# Patient Record
Sex: MALE | Race: WHITE | NOT HISPANIC OR LATINO | Employment: UNEMPLOYED | URBAN - METROPOLITAN AREA
[De-identification: names, ages, dates, MRNs, and addresses within clinical notes are randomized per-mention and may not be internally consistent; named-entity substitution may affect disease eponyms.]

---

## 2021-05-30 ENCOUNTER — RECORDS - HEALTHEAST (OUTPATIENT)
Dept: ADMINISTRATIVE | Facility: CLINIC | Age: 21
End: 2021-05-30

## 2024-03-19 ENCOUNTER — HOSPITAL ENCOUNTER (EMERGENCY)
Facility: CLINIC | Age: 24
Discharge: HOME | End: 2024-03-19
Attending: EMERGENCY MEDICINE | Admitting: EMERGENCY MEDICINE
Payer: MEDICARE

## 2024-03-19 VITALS
BODY MASS INDEX: 21.09 KG/M2 | WEIGHT: 147 LBS | TEMPERATURE: 97.9 F | OXYGEN SATURATION: 97 % | HEART RATE: 56 BPM | RESPIRATION RATE: 16 BRPM | DIASTOLIC BLOOD PRESSURE: 57 MMHG | SYSTOLIC BLOOD PRESSURE: 109 MMHG

## 2024-03-19 DIAGNOSIS — G43.809 OTHER MIGRAINE WITHOUT STATUS MIGRAINOSUS, NOT INTRACTABLE: ICD-10-CM

## 2024-03-19 PROCEDURE — 99283 EMERGENCY DEPT VISIT LOW MDM: CPT

## 2024-03-19 PROCEDURE — 250N000013 HC RX MED GY IP 250 OP 250 PS 637: Performed by: EMERGENCY MEDICINE

## 2024-03-19 RX ORDER — METOCLOPRAMIDE 10 MG/1
10 TABLET ORAL ONCE
Status: COMPLETED | OUTPATIENT
Start: 2024-03-19 | End: 2024-03-19

## 2024-03-19 RX ORDER — IBUPROFEN 600 MG/1
600 TABLET, FILM COATED ORAL ONCE
Status: COMPLETED | OUTPATIENT
Start: 2024-03-19 | End: 2024-03-19

## 2024-03-19 RX ORDER — METOCLOPRAMIDE 10 MG/1
10 TABLET ORAL
Qty: 20 TABLET | Refills: 0 | Status: SHIPPED | OUTPATIENT
Start: 2024-03-19

## 2024-03-19 RX ORDER — METOCLOPRAMIDE 10 MG/1
10 TABLET ORAL
Qty: 20 TABLET | Refills: 0 | Status: SHIPPED | OUTPATIENT
Start: 2024-03-19 | End: 2024-03-19

## 2024-03-19 RX ORDER — DIPHENHYDRAMINE HCL 25 MG
25 CAPSULE ORAL ONCE
Status: COMPLETED | OUTPATIENT
Start: 2024-03-19 | End: 2024-03-19

## 2024-03-19 RX ADMIN — DIPHENHYDRAMINE HYDROCHLORIDE 25 MG: 25 CAPSULE ORAL at 19:34

## 2024-03-19 RX ADMIN — IBUPROFEN 600 MG: 600 TABLET ORAL at 19:32

## 2024-03-19 RX ADMIN — METOCLOPRAMIDE 10 MG: 10 TABLET ORAL at 19:34

## 2024-03-19 ASSESSMENT — COLUMBIA-SUICIDE SEVERITY RATING SCALE - C-SSRS
2. HAVE YOU ACTUALLY HAD ANY THOUGHTS OF KILLING YOURSELF IN THE PAST MONTH?: NO
1. IN THE PAST MONTH, HAVE YOU WISHED YOU WERE DEAD OR WISHED YOU COULD GO TO SLEEP AND NOT WAKE UP?: NO
6. HAVE YOU EVER DONE ANYTHING, STARTED TO DO ANYTHING, OR PREPARED TO DO ANYTHING TO END YOUR LIFE?: NO

## 2024-03-19 ASSESSMENT — ACTIVITIES OF DAILY LIVING (ADL): ADLS_ACUITY_SCORE: 33

## 2024-03-19 NOTE — ED PROVIDER NOTES
"EMERGENCY DEPARTMENT ENCOUNTER      NAME: Louis Cervantes  AGE: 24 year old male  YOB: 2000  MRN: [id number]  EVALUATION DATE & TIME: No admission date for patient encounter.    PCP: Agustin Briseno    ED PROVIDER: Bryant Martinez M.D.      Chief Complaint   Patient presents with    Headache         FINAL IMPRESSION:  Migraine headache      ED COURSE & MEDICAL DECISION MAKING:    Pertinent Labs & Imaging studies reviewed. (See chart for details)  24 year old male presents to the Emergency Department for evaluation of neck.  Patient arrives with his father provides additional information.  Patient describes throbbing achiness.  Localized primarily on the right side of his head.  He states it feels similar to his \"migraines\".  Father states has been giving him Tylenol migraine relief without improvement.  Patient denies any vomiting or diarrhea.  No URI symptoms.  On exam he is a thin male polite and quite active.  Patient with underlying autism.  No frontal, maxillary or mastoid sinus tenderness.  Neck supple no evidence of meningismus.  Review of records indicate patient was seen for similar presentation on 10/18/2023.  CT imaging obtained at that time was normal.  Will proceed with oral therapy in the form of Benadryl, Motrin and Reglan.  No indications for repeat imaging nor laboratory evaluation.. Patient appears non toxic with stable vitals signs. Overall exam is benign.    6:57 PM I met with the patient for the initial interview and physical examination. Discussed plan for treatment and workup in the ED.    8:26 PM.  Patient feeling improved.  Will discharge with Reglan to be used at home with Tylenol and Benadryl.  At the conclusion of the encounter I discussed the results of all of the tests and the disposition. The questions were answered and return precautions provided. The patient or family acknowledged understanding and was agreeable with the care plan.       PPE: Provider wore paper mask. "     MEDICATIONS GIVEN IN THE EMERGENCY:  Medications - No data to display    NEW PRESCRIPTIONS STARTED AT TODAY'S ER VISIT  Current Discharge Medication List        START taking these medications    Details   metoclopramide (REGLAN) 10 MG tablet Take 1 tablet (10 mg) by mouth 4 times daily (before meals and nightly)  Qty: 20 tablet, Refills: 0    Comments: Take with onset of headache.  Combine with Tylenol and Benadryl                 =================================================================    HPI          Louis Cervantes is a 24 year old male with a pertient medical history of bakes, autism who presents to the ED for evaluation of headache.  Patient reports symptoms ongoing for last 2 to 3 days.  Reports it is throbbing discomfort on the right frontal area.  No nausea or vomiting.  Arrives with father provides additional information.  No other family members ill.  No relief with Tylenol migraine.      REVIEW OF SYSTEMS   Constitutional:  Denies fever, chills  Respiratory:  Denies productive cough or increased work of breathing  Cardiovascular:  Denies chest pain, palpitations  GI:  Denies abdominal pain, nausea, vomiting, or change in bowel or bladder habits   Musculoskeletal:  Denies any new muscle/joint swelling  Skin:  Denies rash   Neurologic:  Denies focal weakness  All systems negative except as marked.     PAST MEDICAL HISTORY:  History reviewed. No pertinent past medical history.    PAST SURGICAL HISTORY:  Past Surgical History:   Procedure Laterality Date    EYE SURGERY           CURRENT MEDICATIONS:    No current facility-administered medications for this encounter.    Current Outpatient Medications:     FLUoxetine HCl (PROZAC PO), Take 20 mg by mouth, Disp: , Rfl:     ALLERGIES:  No Known Allergies    FAMILY HISTORY:  History reviewed. No pertinent family history.    SOCIAL HISTORY:   Social History     Socioeconomic History    Marital status: Single     Spouse name: None    Number of children:  None    Years of education: None    Highest education level: None   Tobacco Use    Smoking status: Never   Substance and Sexual Activity    Alcohol use: No    Drug use: No    Sexual activity: Not Currently       VITALS:  Patient Vitals for the past 24 hrs:   BP Temp Temp src Pulse Resp SpO2 Weight   03/19/24 1715 128/75 97  F (36.1  C) Temporal 79 16 98 % 66.7 kg (147 lb)        PHYSICAL EXAM    Constitutional:  Awake, alert, in no apparent distress  HENT:  Normocephalic, Atraumatic. Bilateral external ears normal. Oropharynx moist. Nose normal. Neck- Normal range of motion with no guarding, No midline cervical tenderness, Supple, No stridor.   Eyes:  PERRL, EOMI with no signs of entrapment, Conjunctiva normal, No discharge.   Respiratory:  Normal breath sounds, No respiratory distress, No wheezing.    Cardiovascular:  Normal heart rate, Normal rhythm, No appreciable rubs or gallops.   GI:  Soft, No tenderness, No distension, No palpable masses  Musculoskeletal:  No edema. Good range of motion in all major joints. No tenderness to palpation or major deformities noted.  Integument:  Warm, Dry, No erythema, No rash.   Neurologic:  Alert & oriented, Normal motor function, Normal sensory function, No focal deficits noted.   Psychiatric:  Affect normal, Judgment normal, Mood normal.            I, Page Daniel, am serving as a scribe to document services personally performed by Bryant Martinez MD, based on my observation and the provider's statements to me. IBryant MD attest that Page Daniel is acting in a scribe capacity, has observed my performance of the services and has documented them in accordance with my direction.    Bryant Martinez M.D.  Emergency Medicine  [facility] EMERGENCY ROOM     Bryant Martinez MD  03/19/24 2029

## 2024-03-19 NOTE — ED TRIAGE NOTES
"Pt arrives to ED with c/o headache since Friday. Dad also endorses to lethargy and decreased appetite. Denies nausea and vomiting. Pt states \"tell the doctors to step on it would ya?\" Autistic. Rates pain 8/10.      Triage Assessment (Adult)       Row Name 03/19/24 2645          Triage Assessment    Airway WDL WDL        Respiratory WDL    Respiratory WDL WDL        Skin Circulation/Temperature WDL    Skin Circulation/Temperature WDL WDL        Cardiac WDL    Cardiac WDL WDL        Peripheral/Neurovascular WDL    Peripheral Neurovascular WDL WDL        Cognitive/Neuro/Behavioral WDL    Cognitive/Neuro/Behavioral WDL WDL                     " normal